# Patient Record
Sex: MALE | Race: WHITE | Employment: OTHER | URBAN - METROPOLITAN AREA
[De-identification: names, ages, dates, MRNs, and addresses within clinical notes are randomized per-mention and may not be internally consistent; named-entity substitution may affect disease eponyms.]

---

## 2019-06-28 ENCOUNTER — OFFICE VISIT (OUTPATIENT)
Dept: FAMILY MEDICINE | Facility: CLINIC | Age: 84
End: 2019-06-28
Payer: COMMERCIAL

## 2019-06-28 VITALS
DIASTOLIC BLOOD PRESSURE: 57 MMHG | SYSTOLIC BLOOD PRESSURE: 94 MMHG | OXYGEN SATURATION: 97 % | TEMPERATURE: 99.5 F | RESPIRATION RATE: 20 BRPM | HEART RATE: 60 BPM

## 2019-06-28 DIAGNOSIS — R35.0 URINARY FREQUENCY: ICD-10-CM

## 2019-06-28 DIAGNOSIS — I48.0 PAROXYSMAL ATRIAL FIBRILLATION (H): ICD-10-CM

## 2019-06-28 DIAGNOSIS — G81.90 HEMIPLEGIA OF DOMINANT SIDE (H): Primary | ICD-10-CM

## 2019-06-28 PROBLEM — Z87.442 HISTORY OF KIDNEY STONES: Status: ACTIVE | Noted: 2019-04-30

## 2019-06-28 PROBLEM — I48.91 ATRIAL FIBRILLATION (H): Status: ACTIVE | Noted: 2018-07-03

## 2019-06-28 PROBLEM — I63.9 CEREBROVASCULAR ACCIDENT (CVA) DUE TO EMBOLISM (H): Status: ACTIVE | Noted: 2019-06-28

## 2019-06-28 PROBLEM — N40.1 BENIGN PROSTATIC HYPERPLASIA WITH URINARY FREQUENCY: Status: ACTIVE | Noted: 2019-06-28

## 2019-06-28 PROBLEM — I69.320 APHASIA FOLLOWING CEREBRAL INFARCTION: Status: ACTIVE | Noted: 2019-06-28

## 2019-06-28 PROBLEM — R13.12 OROPHARYNGEAL DYSPHAGIA: Status: ACTIVE | Noted: 2019-06-28

## 2019-06-28 PROBLEM — F09 COGNITIVE DISORDER: Status: ACTIVE | Noted: 2019-06-28

## 2019-06-28 PROBLEM — R35.1 NOCTURIA: Status: ACTIVE | Noted: 2019-04-30

## 2019-06-28 PROCEDURE — 99203 OFFICE O/P NEW LOW 30 MIN: CPT | Performed by: FAMILY MEDICINE

## 2019-06-28 RX ORDER — BUPROPION HYDROCHLORIDE 100 MG/1
1 TABLET, EXTENDED RELEASE ORAL
COMMUNITY
Start: 2018-05-03

## 2019-06-28 RX ORDER — FINASTERIDE 5 MG/1
5 TABLET, FILM COATED ORAL DAILY
COMMUNITY

## 2019-06-28 RX ORDER — MIRTAZAPINE 7.5 MG/1
7.5 TABLET, FILM COATED ORAL
COMMUNITY
Start: 2019-05-30 | End: 2019-08-28

## 2019-06-28 RX ORDER — AMLODIPINE BESYLATE 5 MG/1
5 TABLET ORAL DAILY
COMMUNITY

## 2019-06-28 RX ORDER — FINASTERIDE 5 MG/1
5 TABLET, FILM COATED ORAL
COMMUNITY
Start: 2016-07-06

## 2019-06-28 RX ORDER — AMLODIPINE BESYLATE 5 MG/1
5 TABLET ORAL
COMMUNITY
Start: 2019-02-14

## 2019-06-28 ASSESSMENT — PATIENT HEALTH QUESTIONNAIRE - PHQ9: SUM OF ALL RESPONSES TO PHQ QUESTIONS 1-9: 19

## 2019-06-28 NOTE — NURSING NOTE
Chief Complaint   Patient presents with     Establish Care     BP 94/57 (BP Location: Left arm, Patient Position: Sitting, Cuff Size: Adult Regular)   Pulse 60   Temp 99.5  F (37.5  C) (Oral)   Resp 20   SpO2 97%  There is no height or weight on file to calculate BMI.  bp completed using cuff size: regular       Health Maintenance addressed:  PHQ9    Possibly completing today per provider review.    Vincent Villagomez MA

## 2019-06-28 NOTE — PROGRESS NOTES
Subjective     Kamran Boone is a 84 year old male who presents to clinic today for the following health issues:    HPI   New Patient/Transfer of Care  84-year-old who presents to the clinic with his wife to establish care.  The patient had a stroke on March 11, 2018 (left MCA).  He received thrombolytic therapy at that time. She believes he underwent testing of the carotid artery, had an echocardiogram, a CT scan, and an MRI.  Initially he was placed on aspirin and statin.  [employer] LINQ was placed.  Currently he has right-sided paralysis, dysphagia, urinary frequency and urinary urgency.  Mild cognitive decline.    Wife states that he has regular follow-up with cardiology and urology.  She states that he currently has urinary urgency and urinary frequency.  Voids every 15 to 20 minutes.  Wife is able to keep up with the frequency of urination during the day but she is not able to keep up with the frequency of urination at night.  He was initially started on oxybutynin and Flomax but was later transitioned to tolterodine.  She does have enough of the tolterodine at home and desires to restart it again.    Kamran and his wife are currently living in [facility] living facility.  Although they have an assisted living facility as well but they do not receive any services at this time.    Review of Systems   ROS COMP: Constitutional, HEENT, cardiovascular, pulmonary, gi and gu systems are negative, except as otherwise noted.      Objective    BP 94/57 (BP Location: Left arm, Patient Position: Sitting, Cuff Size: Adult Regular)   Pulse 60   Temp 99.5  F (37.5  C) (Oral)   Resp 20   SpO2 97%   There is no height or weight on file to calculate BMI.  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  RESP: lungs clear to auscultation - no rales, rhonchi or wheezes  CV: regular rate and rhythm, normal S1 S2, no S3 or S4, no murmur, click or rub, no peripheral edema and peripheral  pulses strong    Diagnostic Test Results:  Labs reviewed in Epic        Assessment & Plan       ICD-10-CM    1. Hemiplegia of dominant side (H) G81.90 HOME CARE NURSING REFERRAL   2. Urinary frequency R35.0 order for DME   3. Paroxysmal atrial fibrillation (H) I48.0      I had an extended discussion with the patient's wife today.  The goal from today's visit is to get a prescription for a condom catheter so the patient could use it at night.  The wife was given a prescription for a condom catheter and was also given a information regarding obtaining the catheter from [employer].  She was advised to observe the catheter first before attempting to put herself.      In terms of the urinary frequency during the day, wife was advised to restart oxybutynin or tolterodine.  She already has enough tablets at home.  Although the wife is currently doing all of his ADLs she will benefit from an evaluation by our home care team.  The patient will benefit from nurse visits as well as PCA services.    Reviewing cardiology's notes from [facility], it was noted that the patient was diagnosed with paroxysmal atrial fibrillation.  Patient's wife states that he was not diagnosed with atrial fib. cardiac device loop recorder did document approximately 4 to 7.5-minute episodes.  The patient is currently taking Eliquis.    Kamran will continue to follow-up with cardiology and urology at the [facility].    Return in about 6 months (around 12/28/2019) for Routine Visit with PCP .    Melida Mondragon MD  [facility]

## 2019-06-28 NOTE — PATIENT INSTRUCTIONS

## 2019-06-30 PROBLEM — I48.0 PAROXYSMAL ATRIAL FIBRILLATION (H): Status: ACTIVE | Noted: 2018-07-03
